# Patient Record
Sex: MALE | Race: WHITE | NOT HISPANIC OR LATINO
[De-identification: names, ages, dates, MRNs, and addresses within clinical notes are randomized per-mention and may not be internally consistent; named-entity substitution may affect disease eponyms.]

---

## 2023-07-18 ENCOUNTER — APPOINTMENT (OUTPATIENT)
Dept: ORTHOPEDIC SURGERY | Facility: CLINIC | Age: 23
End: 2023-07-18
Payer: COMMERCIAL

## 2023-07-18 VITALS — BODY MASS INDEX: 28.36 KG/M2 | HEIGHT: 74 IN | WEIGHT: 221 LBS

## 2023-07-18 DIAGNOSIS — M79.672 PAIN IN LEFT FOOT: ICD-10-CM

## 2023-07-18 PROBLEM — Z00.00 ENCOUNTER FOR PREVENTIVE HEALTH EXAMINATION: Status: ACTIVE | Noted: 2023-07-18

## 2023-07-18 PROCEDURE — 99203 OFFICE O/P NEW LOW 30 MIN: CPT

## 2023-07-18 PROCEDURE — 73630 X-RAY EXAM OF FOOT: CPT | Mod: LT

## 2023-07-18 NOTE — HISTORY OF PRESENT ILLNESS
[de-identified] : 23-year-old male presents with left foot pain.  Patient's had this pain for approximately 2 to 3 weeks that started atraumatically.  This pain started out with weightbearing, and has progressively worsened, today he states he is barely able to put any weight at all.  Patient denies any past injuries or surgeries to the foot.  Denies having the symptoms prior.  Patient takes anti-inflammatories and rest the foot for pain relief.  Patient admits to noticing swelling of the foot when his pain is at its worst.

## 2023-07-18 NOTE — DISCUSSION/SUMMARY
[de-identified] : Patient has left foot pain.  Today, an MRI is warranted for further evaluation of the left foot to rule out a Chan's neuroma versus metatarsalgia.  At this time, I advised patient to buy dancers pads, showed him what they are via [employer], try to modify his activity based on pain, alternate with ice and heat, and take ibuprofen for pain relief.  I advised patient to take ibuprofen for the next few days around-the-clock for inflammation purposes.  Patient will follow-up in approximately 3 weeks with Dr. Rodrigues to go over MRI results and for further treatment options.  Patient is agreeable to above plan all questions were answered today

## 2023-07-18 NOTE — PHYSICAL EXAM
[de-identified] : Physical exam of the left foot:\par -Mild swelling of the diffuse foot.  Skin intact\par -TTP over the second and third webspace, second and third fourth head of the metatarsals.\par -Patient has full range of motion of the foot and ankle with no pain\par -+2 dorsalis pedis pulse \par -Sensation intact to light touch

## 2023-07-18 NOTE — DATA REVIEWED
[FreeTextEntry1] : X-ray images were obtained at the office today.  AP, lateral, oblique views of the left foot reveal no acute fractures, dislocations, bony abnormalities

## 2023-07-27 ENCOUNTER — APPOINTMENT (OUTPATIENT)
Dept: MRI IMAGING | Facility: CLINIC | Age: 23
End: 2023-07-27

## 2023-08-11 ENCOUNTER — APPOINTMENT (OUTPATIENT)
Dept: ORTHOPEDIC SURGERY | Facility: CLINIC | Age: 23
End: 2023-08-11